# Patient Record
Sex: FEMALE | Race: OTHER | URBAN - METROPOLITAN AREA
[De-identification: names, ages, dates, MRNs, and addresses within clinical notes are randomized per-mention and may not be internally consistent; named-entity substitution may affect disease eponyms.]

---

## 2023-12-14 ENCOUNTER — TELEPHONE (OUTPATIENT)
Dept: ORTHOPEDICS CLINIC | Facility: CLINIC | Age: 38
End: 2023-12-14

## 2023-12-14 DIAGNOSIS — M25.511 RIGHT SHOULDER PAIN, UNSPECIFIED CHRONICITY: Primary | ICD-10-CM

## 2023-12-14 NOTE — TELEPHONE ENCOUNTER
Sepideh has an appointment scheduled with Dr. Jignesh Naik on 12/21/23 for right shoulder injury due to falling. Please advise if imaging is needed prior.

## 2024-03-20 NOTE — TELEPHONE ENCOUNTER
Patient is scheduled for LEFT shoulder pain. Please advise if imaging is needed.     Future Appointments   Date Time Provider Department Center   4/2/2024 11:00 AM Toan Richter,  [facility]

## 2024-04-02 NOTE — H&P
Sports Medicine Clinic Note     Subjective:    Chief Complaint   Patient presents with    Shoulder Injury     Lt shoulder injury  doi: 2 years ago  - pt fell on the lt shoulder, pain has worsened recently especially when lifting  - works as a cna  - pt did not help      History of Present Illness: This is a pleasant 38 year old patient who presents with primarily left sided neck and shoulder pain, with a duration of 2 years. Reports discomfort and weakness, exacerbated by overhead activities and certain movements.The patient describes a gradual worsening of shoulder and neck pain after a mechanical fall directly onto the left shoulder. Pain is localized around the shoulder, worsened by lifting objects, reaching overhead, and during the night. Denies any systemic symptoms, or previous surgeries to this shoulder. She reports sometimes the pain shoots into all 5 fingers and is associated with numbness and tingling in all digits as well but this is intermittent. Has previously tried months of chiropractic care mainly focusing on neck and shoulder \"adjustments\" but no formal PT or injection therapy.    Objective:    Left Shoulder Examination:    Inspection:   No apparent deformity, swelling, or discoloration of the shoulder. No atrophy of the deltoid or supraspinatus muscles.    Palpation:   Tenderness noted at the supraspinatus tendon and subacromial space. No warmth or crepitus.    Range of Motion:   Active and passive range of motion limited by pain, especially with abduction and external rotation.    Strength Testing:   Mild weakness noted in abduction and external rotation.    Neurovascular Exam:   Intact sensation and normal radial pulse.    Special Tests:  - Positive Neer (impingement)  - Positive Unger (impingement)  - Positive Empty Can (supraspinatus)  - Positive Lift Off (subscapularis)  - Negative ER Lag (infraspinatous/teres minor)  - Negative IR Lag (subscapularis)  - Negative Drop Arm (supraspinatus)  -  Negative Hornblowers (teres minor)  - Negative Yergason (biceps)  - Negative Speed's (biceps)  - Negative O’katarina’s (labrum)  - Negative Apprehension (labrum)  - Positive Spurling (cervical radiculopathy) left side    Imaging:    Radiographs of the affected shoulder: Personally reviewed. No apparent fracture or dislocation. No significant osteophyte formation or joint space narrowing however suspect type 2 acromion suspected with subacromial enthesophytes.    Assessment:    Rotator cuff and subacromial impingement syndrome, left.  Clinical evidence of cervical radiculopathy, left sided.    Plan:    Further Imaging: May recommend MRI of the shoulder or neck in the future to evaluate for shoulder derangement if patient fails conservative management. Would need c-spine radiographs first before MRI and order for this is signed today but defer for now in favor of shoulder directed initial management.  Physical Therapy: Referral for physical therapy focusing on rotator cuff strengthening and range of motion exercises.  Over-the-Counter Medications: Advise the use of acetaminophen for pain control.  Prescription Medications: Prescribe Diclofenac to be taken as needed for pain and inflammation.  Activity Recommendations: Advise the patient to avoid activities that exacerbate symptoms, particularly overhead activities.    Follow-up: Schedule a follow-up visit in 4-6 weeks to reassess symptoms. A corticosteroid injection is a potential next step, to be evaluated during follow-up.     Toan Richter DO, CAJAQUELINE   Primary Care Sports Medicine    Department of Orthopaedic Surgery  [facility]    [address] [place] [id number]   [address] [place] [address]    t: [phone]  f: [phone]      [facility]

## (undated) NOTE — LETTER
DATE: 04.02.2024  To Whom It May Concern:    Sonia Wasserman is currently under my medical care and she may return to work on 04.03.2024.    Activity is restricted as follows: light duty.    If you require additional information please contact our office.      Toan Richter DO, ANTONELLA   Primary Care Sports Medicine    Department of Orthopaedic Surgery  [address] [address]   [address] [place] [address]    t: [phone]  f: [phone]      [facility]